# Patient Record
Sex: FEMALE | Race: WHITE | NOT HISPANIC OR LATINO | Employment: FULL TIME | URBAN - METROPOLITAN AREA
[De-identification: names, ages, dates, MRNs, and addresses within clinical notes are randomized per-mention and may not be internally consistent; named-entity substitution may affect disease eponyms.]

---

## 2017-06-07 ENCOUNTER — APPOINTMENT (OUTPATIENT)
Dept: WOMENS IMAGING | Facility: HOSPITAL | Age: 57
End: 2017-06-07

## 2017-06-07 PROCEDURE — 77067 SCR MAMMO BI INCL CAD: CPT | Performed by: RADIOLOGY

## 2017-06-07 PROCEDURE — G0202 SCR MAMMO BI INCL CAD: HCPCS | Performed by: RADIOLOGY

## 2018-06-12 ENCOUNTER — APPOINTMENT (OUTPATIENT)
Dept: WOMENS IMAGING | Facility: HOSPITAL | Age: 58
End: 2018-06-12

## 2018-06-12 PROCEDURE — 77067 SCR MAMMO BI INCL CAD: CPT | Performed by: RADIOLOGY

## 2019-06-17 ENCOUNTER — APPOINTMENT (OUTPATIENT)
Dept: WOMENS IMAGING | Facility: HOSPITAL | Age: 59
End: 2019-06-17

## 2019-06-17 PROCEDURE — 77067 SCR MAMMO BI INCL CAD: CPT | Performed by: RADIOLOGY

## 2020-06-30 ENCOUNTER — APPOINTMENT (OUTPATIENT)
Dept: WOMENS IMAGING | Facility: HOSPITAL | Age: 60
End: 2020-06-30

## 2020-06-30 PROCEDURE — 77063 BREAST TOMOSYNTHESIS BI: CPT | Performed by: RADIOLOGY

## 2020-06-30 PROCEDURE — 77067 SCR MAMMO BI INCL CAD: CPT | Performed by: RADIOLOGY

## 2021-08-02 ENCOUNTER — OFFICE VISIT (OUTPATIENT)
Dept: FAMILY MEDICINE CLINIC | Facility: CLINIC | Age: 61
End: 2021-08-02

## 2021-08-02 VITALS
BODY MASS INDEX: 28.34 KG/M2 | DIASTOLIC BLOOD PRESSURE: 68 MMHG | HEIGHT: 62 IN | OXYGEN SATURATION: 100 % | HEART RATE: 64 BPM | TEMPERATURE: 97.7 F | WEIGHT: 154 LBS | SYSTOLIC BLOOD PRESSURE: 107 MMHG | RESPIRATION RATE: 16 BRPM

## 2021-08-02 DIAGNOSIS — E78.2 MIXED HYPERLIPIDEMIA: ICD-10-CM

## 2021-08-02 DIAGNOSIS — Z91.09 ENVIRONMENTAL ALLERGIES: ICD-10-CM

## 2021-08-02 DIAGNOSIS — Z12.31 VISIT FOR SCREENING MAMMOGRAM: ICD-10-CM

## 2021-08-02 DIAGNOSIS — E03.9 ACQUIRED HYPOTHYROIDISM: Primary | ICD-10-CM

## 2021-08-02 PROBLEM — F41.9 ANXIETY: Status: ACTIVE | Noted: 2021-08-02

## 2021-08-02 PROBLEM — M89.9 BONE DISORDER: Status: ACTIVE | Noted: 2021-08-02

## 2021-08-02 PROBLEM — E78.5 HYPERLIPIDEMIA: Status: ACTIVE | Noted: 2021-08-02

## 2021-08-02 PROBLEM — E07.9 THYROID DISEASE: Status: ACTIVE | Noted: 2021-08-02

## 2021-08-02 PROBLEM — F32.A DEPRESSION: Status: ACTIVE | Noted: 2021-08-02

## 2021-08-02 PROBLEM — R51.9 HEADACHE: Status: ACTIVE | Noted: 2021-08-02

## 2021-08-02 PROCEDURE — 99203 OFFICE O/P NEW LOW 30 MIN: CPT | Performed by: FAMILY MEDICINE

## 2021-08-02 RX ORDER — MULTIVIT WITH MINERALS/LUTEIN
1 TABLET ORAL DAILY
COMMUNITY

## 2021-08-02 RX ORDER — LEVOTHYROXINE SODIUM 88 UG/1
1 TABLET ORAL
COMMUNITY
Start: 2021-06-09

## 2021-08-02 RX ORDER — LEVOTHYROXINE SODIUM 88 UG/1
1 TABLET ORAL DAILY
COMMUNITY
End: 2021-08-02

## 2021-08-02 RX ORDER — DIPHENOXYLATE HYDROCHLORIDE AND ATROPINE SULFATE 2.5; .025 MG/1; MG/1
1 TABLET ORAL DAILY
COMMUNITY

## 2021-08-02 RX ORDER — LEVOTHYROXINE SODIUM 88 UG/1
88 TABLET ORAL
COMMUNITY
Start: 2021-02-20 | End: 2021-08-02

## 2021-08-02 RX ORDER — LEVOTHYROXINE AND LIOTHYRONINE 57; 13.5 UG/1; UG/1
1 TABLET ORAL DAILY
COMMUNITY
End: 2021-08-02

## 2021-08-02 RX ORDER — FLUTICASONE PROPIONATE 50 MCG
1 SPRAY, SUSPENSION (ML) NASAL DAILY
COMMUNITY
Start: 2016-11-17

## 2021-08-02 NOTE — PROGRESS NOTES
Subjective   Sindy Smith is a 60 y.o. female.     Chief Complaint   Patient presents with   • Establish Care   • Hyperlipidemia     Patient is not fasting today.    • Hypothyroidism   • Headache         Current Outpatient Medications:   •  calcium citrate-vitamin d (CALCITRATE) 315-250 MG-UNIT tablet tablet, Take 1 tablet by mouth Daily., Disp: , Rfl:   •  Cetirizine HCl 10 MG capsule, Take 1 tablet by mouth Daily., Disp: , Rfl:   •  CINNAMON PO, Take 1 capsule by mouth Daily., Disp: , Rfl:   •  Euthyrox 88 MCG tablet, Take 1 tablet by mouth Before Breakfast., Disp: , Rfl:   •  fluticasone (FLONASE) 50 MCG/ACT nasal spray, 1 spray into the nostril(s) as directed by provider Daily., Disp: , Rfl:   •  multivitamin (MULTIPLE VITAMINS PO), Take 1 tablet by mouth Daily., Disp: , Rfl:   •  vitamin C (ASCORBIC ACID) 250 MG tablet, Take 1 tablet by mouth Daily., Disp: , Rfl:     Past Medical History:   Diagnosis Date   • Allergic    • Anxiety 8/2/2021   • Arthritis    • Depression 8/2/2021   • Headache 8/2/2021   • Hyperlipidemia 8/2/2021   • Hypothyroidism    • MAMMO     NEG = 2020   • PAP     NEG = 2020       Past Surgical History:   Procedure Laterality Date   • APPENDECTOMY  2019   • COLONOSCOPY      2014 = NEG, rech 2024   • TUBAL ABDOMINAL LIGATION  1990   • VOCAL CORD MASS EXCISION      Cyst       Family History   Problem Relation Age of Onset   • Cancer Mother         Pancreatic Cancer    • Hyperlipidemia Father    • Heart disease Father         MI @ 40   • Hyperlipidemia Sister    • Diabetes Brother        Social History     Socioeconomic History   • Marital status: Unknown     Spouse name: Not on file   • Number of children: Not on file   • Years of education: Not on file   • Highest education level: Not on file   Tobacco Use   • Smoking status: Never Smoker   • Smokeless tobacco: Never Used   Vaping Use   • Vaping Use: Never used   Substance and Sexual Activity   • Alcohol use: Yes     Comment: 1-2 drinks a  week    • Drug use: Never   • Sexual activity: Defer       61 y/o C female here to est care w/ hx/o Hypothyroid/ CHOL/ HA    Pt currently taking thyroid meds and otc allergy meds-----Pt sees Dr Alejandre for her thyroid and states she does labs for this as well       The following portions of the patient's history were reviewed and updated as appropriate: allergies, current medications, past family history, past medical history, past social history, past surgical history and problem list.    Review of Systems   Constitutional: Negative for activity change, appetite change, fatigue, unexpected weight gain and unexpected weight loss.   Respiratory: Negative for cough, chest tightness and shortness of breath.    Cardiovascular: Negative for chest pain, palpitations and leg swelling.   Gastrointestinal: Negative for constipation and diarrhea.   Genitourinary: Negative for frequency, urgency and urinary incontinence.   Musculoskeletal: Negative for arthralgias and myalgias.   Neurological: Negative for dizziness, facial asymmetry, speech difficulty, weakness, light-headedness, headache, memory problem and confusion.   Psychiatric/Behavioral: Negative for sleep disturbance.       Vitals:    08/02/21 1402   BP: 107/68   Pulse: 64   Resp: 16   Temp: 97.7 °F (36.5 °C)   SpO2: 100%       Objective   Physical Exam  Vitals and nursing note reviewed.   Constitutional:       General: She is not in acute distress.     Appearance: She is well-developed. She is not ill-appearing or toxic-appearing.   HENT:      Head: Normocephalic and atraumatic.   Cardiovascular:      Rate and Rhythm: Normal rate and regular rhythm.      Heart sounds: Normal heart sounds. No murmur heard.     Pulmonary:      Effort: Pulmonary effort is normal. No respiratory distress.      Breath sounds: Normal breath sounds. No stridor. No wheezing, rhonchi or rales.   Musculoskeletal:      Cervical back: Normal range of motion and neck supple.   Skin:     General:  Skin is warm and dry.      Findings: No rash.   Neurological:      Mental Status: She is alert and oriented to person, place, and time.   Psychiatric:         Mood and Affect: Mood normal.         Behavior: Behavior normal.         Thought Content: Thought content normal.         Judgment: Judgment normal.           Assessment/Plan   Diagnoses and all orders for this visit:    1. Acquired hypothyroidism (Primary)  -     Comprehensive Metabolic Panel; Future  -     T4, Free; Future  -     TSH; Future    2. Mixed hyperlipidemia  -     Lipid Panel; Future    3. Environmental allergies    4. Visit for screening mammogram  -     Mammo Screening Digital Tomosynthesis Bilateral With CAD; Future    Vaccines disc'd  Copy of most recent labs  Rx Mammo

## 2021-08-09 ENCOUNTER — TELEPHONE (OUTPATIENT)
Dept: FAMILY MEDICINE CLINIC | Facility: CLINIC | Age: 61
End: 2021-08-09

## 2021-08-09 NOTE — TELEPHONE ENCOUNTER
Caller: David Smith    Relationship: Self    Best call back number: [phone]   What is the best time to reach you:ANYTIME   Who are you requesting to speak with (clinical staff, provider,  specific staff member): CLINICAL    Do you know the name of the person who called: DAVID    What was the call regarding: PATIENT WANTS TO ASK SOME QUESTION ABOUT COVID SHE HAS POSSIBLY HAD EXPOSURE.     Do you require a callback: YES

## 2021-08-09 NOTE — TELEPHONE ENCOUNTER
Pt just wanted information to know if you do any medication pretreatment for covid (ivermectin or hydroxychloroquine), if she were to be directly exposed or become ill. Her daughter-in-law had an exposure at work, but neither she or the pt are symptomatic. She's just trying to be proactive and wanted your advice.

## 2021-08-10 NOTE — TELEPHONE ENCOUNTER
None of that is helpful  Can take vit d 2000 IU qday/ vit C 500mg and zinc 50mg qday for prevention

## 2021-08-22 PROBLEM — E03.9 HYPOTHYROIDISM: Status: ACTIVE | Noted: 2021-08-02

## 2021-09-02 ENCOUNTER — TELEPHONE (OUTPATIENT)
Dept: FAMILY MEDICINE CLINIC | Facility: CLINIC | Age: 61
End: 2021-09-02

## 2021-09-02 PROCEDURE — U0004 COV-19 TEST NON-CDC HGH THRU: HCPCS | Performed by: FAMILY MEDICINE

## 2021-09-07 ENCOUNTER — TELEPHONE (OUTPATIENT)
Dept: FAMILY MEDICINE CLINIC | Facility: CLINIC | Age: 61
End: 2021-09-07

## 2021-09-07 RX ORDER — ONDANSETRON 4 MG/1
4 TABLET, FILM COATED ORAL EVERY 8 HOURS PRN
Qty: 20 TABLET | Refills: 0 | Status: SHIPPED | OUTPATIENT
Start: 2021-09-07

## 2021-09-07 NOTE — TELEPHONE ENCOUNTER
SW , Les. States pt currently taking a nap and just feels wiped out. Main symptoms are bodyaches and nausea. Breathing is ok and they know to take her to ER if condition worsens. Still taking abx for sinus infection also. Asks if she could get something for the nausea sent into [facility] [address], please.

## 2021-09-07 NOTE — TELEPHONE ENCOUNTER
----- Message from Starla Londono DO sent at 9/3/2021  8:50 PM EDT -----  Regarding: COVID-19 Lab Results (Informational Only - No Action Required)  COVID=POSITIVE----see how she is doing

## 2022-01-24 ENCOUNTER — TELEPHONE (OUTPATIENT)
Dept: FAMILY MEDICINE CLINIC | Facility: CLINIC | Age: 62
End: 2022-01-24

## 2022-01-24 NOTE — TELEPHONE ENCOUNTER
HUB to read  I called and left a VM with information.     I dont give out zpak antibiotics just because someone is +COVID------this is a virus and zpak is for bacterial pneumonia

## 2022-01-24 NOTE — TELEPHONE ENCOUNTER
I dont give out zpak abx just because someone is +COVID------this is a virus and zpak is for bacterial pneumonia

## 2022-01-25 ENCOUNTER — TELEPHONE (OUTPATIENT)
Dept: FAMILY MEDICINE CLINIC | Facility: CLINIC | Age: 62
End: 2022-01-25

## 2022-01-25 NOTE — TELEPHONE ENCOUNTER
Caller: Sindy Smith    Relationship: Self    Best call back number: [phone]    What medication are you requesting: COUGH MEDICINE    What are your current symptoms: COUGH    How long have you been experiencing symptoms: ABOUT 3 DAYS    Have you had these symptoms before:    [] Yes  [x] No    Have you been treated for these symptoms before:   [] Yes  [x] No    If a prescription is needed, what is your preferred pharmacy and phone number:  [facility]/pharmacy #[id number] - [place], [place] - [address] - [phone]  - [phone] FX      Additional notes: PATIENT CANT SLEEP AND SHE IS HAVING SOME ABDOMINAL SORENESS AND THROAT SORENESS FROM COUGHING SO MUCH. HUB READ INFORMATION ABOUT [facility], PATIENT VOICED UNDERSTANDING. PATIENT IS REQUESTING A CALLBACK ABOUT THIS MEDICATION REQUEST

## 2022-01-26 NOTE — TELEPHONE ENCOUNTER
HUB TO READ  LVM for pt.  Per Dr Londono appt is needed.  Dr Londono does not have anything available today.  Advised to go to [facility] [place].

## 2022-02-04 ENCOUNTER — TELEPHONE (OUTPATIENT)
Dept: FAMILY MEDICINE CLINIC | Facility: CLINIC | Age: 62
End: 2022-02-04

## 2022-02-04 NOTE — TELEPHONE ENCOUNTER
Patient was notified that the mammogram order should be good for a year. Patient states that she has [facility] phone number and she will gave them a call.

## 2022-02-04 NOTE — TELEPHONE ENCOUNTER
PATIENT IS WANTING TO GET HER MAMMOGRAM DONE, BUT IT MAY BE -- WAS ORDEREDIN 2021     CAN YOU CALL PATIENT AND CONFIRM WITH HER     PLEASE ADVISE   Sindy Smith (Self) [phone] (H)

## 2022-09-26 ENCOUNTER — ON CAMPUS - OUTPATIENT (OUTPATIENT)
Dept: URBAN - METROPOLITAN AREA HOSPITAL 2 | Facility: HOSPITAL | Age: 62
End: 2022-09-26
Payer: MEDICARE

## 2022-09-26 VITALS
RESPIRATION RATE: 22 BRPM | HEART RATE: 87 BPM | OXYGEN SATURATION: 98 % | HEART RATE: 85 BPM | OXYGEN SATURATION: 96 % | SYSTOLIC BLOOD PRESSURE: 134 MMHG | SYSTOLIC BLOOD PRESSURE: 94 MMHG | OXYGEN SATURATION: 100 % | DIASTOLIC BLOOD PRESSURE: 89 MMHG | WEIGHT: 152 LBS | RESPIRATION RATE: 18 BRPM | SYSTOLIC BLOOD PRESSURE: 111 MMHG | DIASTOLIC BLOOD PRESSURE: 56 MMHG | HEART RATE: 89 BPM | DIASTOLIC BLOOD PRESSURE: 70 MMHG | SYSTOLIC BLOOD PRESSURE: 100 MMHG | RESPIRATION RATE: 15 BRPM | DIASTOLIC BLOOD PRESSURE: 53 MMHG | RESPIRATION RATE: 16 BRPM | SYSTOLIC BLOOD PRESSURE: 109 MMHG | HEIGHT: 62 IN | SYSTOLIC BLOOD PRESSURE: 92 MMHG | DIASTOLIC BLOOD PRESSURE: 64 MMHG | HEART RATE: 75 BPM | DIASTOLIC BLOOD PRESSURE: 73 MMHG | HEART RATE: 82 BPM | TEMPERATURE: 97.7 F | OXYGEN SATURATION: 99 % | HEART RATE: 84 BPM | HEART RATE: 86 BPM | DIASTOLIC BLOOD PRESSURE: 62 MMHG | SYSTOLIC BLOOD PRESSURE: 129 MMHG

## 2022-09-26 DIAGNOSIS — Z12.11 ENCOUNTER FOR SCREENING FOR MALIGNANT NEOPLASM OF COLON: ICD-10-CM

## 2022-09-26 DIAGNOSIS — K57.30 DIVERTICULOSIS OF LARGE INTESTINE WITHOUT PERFORATION OR ABS: ICD-10-CM

## 2022-09-26 PROCEDURE — 45378 DIAGNOSTIC COLONOSCOPY: CPT | Mod: 33 | Performed by: INTERNAL MEDICINE

## 2023-08-07 ENCOUNTER — OFFICE VISIT (OUTPATIENT)
Dept: ORTHOPEDIC SURGERY | Facility: CLINIC | Age: 63
End: 2023-08-07
Payer: COMMERCIAL

## 2023-08-07 VITALS — WEIGHT: 154 LBS | HEIGHT: 62 IN | BODY MASS INDEX: 28.34 KG/M2 | HEART RATE: 74 BPM

## 2023-08-07 DIAGNOSIS — M25.532 ACUTE PAIN OF LEFT WRIST: Primary | ICD-10-CM

## 2023-08-07 PROCEDURE — 99203 OFFICE O/P NEW LOW 30 MIN: CPT | Performed by: ORTHOPAEDIC SURGERY

## 2023-08-07 NOTE — PROGRESS NOTES
"     Patient ID: Sindy Smith is a 62 y.o. female.    Chief Complaint:    Chief Complaint   Patient presents with    Left Wrist - Initial Evaluation, Pain     Pain 7/24/2023 Pain 3-4       HPI:  This is a 62-year-old female with left wrist pain after a fall about 2 weeks ago.  She did obtain an x-ray on Friday for continued pain and has not been placed into a brace.  Pain is somewhat diffuse over the thumb side of the wrist as well as the ulnar styloid  Past Medical History:   Diagnosis Date    Allergic     Anxiety 08/02/2021    Arthritis     Depression 08/02/2021    Headache 08/02/2021    Hyperlipidemia 08/02/2021    Hypothyroidism     MAMMO     NEG = 2020/ 2022    Osteoporosis     PAP     NEG = 2020       Past Surgical History:   Procedure Laterality Date    APPENDECTOMY  2019    COLONOSCOPY      2014/ 2022 = NEG, rech 2032         GSI    TUBAL ABDOMINAL LIGATION  1990    VOCAL CORD MASS EXCISION      Cyst       Family History   Problem Relation Age of Onset    Cancer Mother         Pancreatic Cancer     Hyperlipidemia Father     Heart disease Father         MI @ 40    Hyperlipidemia Sister     Diabetes Brother           Social History     Occupational History    Not on file   Tobacco Use    Smoking status: Never     Passive exposure: Never    Smokeless tobacco: Never   Vaping Use    Vaping Use: Never used   Substance and Sexual Activity    Alcohol use: Yes     Comment: 1-2 drinks a week     Drug use: Never    Sexual activity: Defer      Review of Systems   Cardiovascular:  Negative for chest pain.   Musculoskeletal:  Positive for arthralgias.     Objective:    Pulse 74   Ht 157.5 cm (62\")   Wt 69.9 kg (154 lb)   BMI 28.17 kg/mý     Physical Examination:  Left wrist demonstrates some resolving bruising and mild tenderness over the ulnar styloid there is some mild pain in the snuffbox.  No real pain over the distal radius no significant pain to the carpus  Sensory and motor exam are intact all distributions. " Radial pulse is palpable and capillary refill is less than two seconds to all digits.    Imaging:  Prior x-rays are reviewed there appears to be an incomplete fracture of the dorsum of the triquetrum    Assessment:  Left wrist pain possible triquetrum fracture    Plan:  Discussed the findings on exam and x-ray there is not really a significant correlation between this fracture line and her presentation.  However I would continue the brace but allow her to come out of her range of motion with activity restriction shlomo-ray in 3 weeks      Procedures         Disclaimer: Part of this note may be an electronic transcription/translation of spoken language to printed text using the Dragon Dictation System

## 2023-08-21 ENCOUNTER — OFFICE VISIT (OUTPATIENT)
Dept: ORTHOPEDIC SURGERY | Facility: CLINIC | Age: 63
End: 2023-08-21
Payer: COMMERCIAL

## 2023-08-21 VITALS — WEIGHT: 154 LBS | BODY MASS INDEX: 28.34 KG/M2 | HEART RATE: 77 BPM | HEIGHT: 62 IN

## 2023-08-21 DIAGNOSIS — M25.532 ACUTE PAIN OF LEFT WRIST: Primary | ICD-10-CM

## 2023-08-21 PROCEDURE — 99212 OFFICE O/P EST SF 10 MIN: CPT | Performed by: ORTHOPAEDIC SURGERY

## 2023-08-21 RX ORDER — IVERMECTIN 3 MG/1
TABLET ORAL
COMMUNITY
Start: 2023-08-17

## 2023-08-21 NOTE — PROGRESS NOTES
Patient ID: Sindy Smith is a 62 y.o. female.  Left wrist pain  Possible triquetrum fracture suffered July 24 treated conservatively  Pain overall improving is mainly ulnar  Review of Systems:        Objective:    There were no vitals taken for this visit.    Physical Examination:     Left wrist has some mild tenderness over the dorsum of the carpus just distal to the ulnar styloid no pain in the snuffbox  Has full range of motion the elbow forearm wrist and digits    Imaging:   left Wrist X-Ray  Indication: Wrist pain possible fracture  AP, Lateral oblique views  Findings: Sclerosis at the area of previously visualized nondisplaced triquetral fracture  no bony lesion  Soft tissues normal  normal joint spaces  Hardware appropriately positioned not applicable      yes prior studies available for comparison    Assessment:    Healing triquetrum fracture    Plan:   She can wean out of the brace discussed normal recovery if symptoms continue longer than the next 3 to 4 weeks see me as needed      Procedures          Disclaimer: Part of this note may be an electronic transcription/translation of spoken language to printed text using the Dragon Dictation System

## 2024-06-05 ENCOUNTER — TELEPHONE (OUTPATIENT)
Dept: FAMILY MEDICINE CLINIC | Facility: CLINIC | Age: 64
End: 2024-06-05
Payer: COMMERCIAL

## 2024-06-05 DIAGNOSIS — E78.2 MIXED HYPERLIPIDEMIA: Primary | ICD-10-CM

## 2024-06-10 ENCOUNTER — CLINICAL SUPPORT (OUTPATIENT)
Dept: FAMILY MEDICINE CLINIC | Facility: CLINIC | Age: 64
End: 2024-06-10
Payer: COMMERCIAL

## 2024-06-10 DIAGNOSIS — E78.2 MIXED HYPERLIPIDEMIA: ICD-10-CM

## 2024-06-10 PROCEDURE — 80061 LIPID PANEL: CPT | Performed by: FAMILY MEDICINE

## 2024-06-10 PROCEDURE — 36415 COLL VENOUS BLD VENIPUNCTURE: CPT | Performed by: FAMILY MEDICINE

## 2024-06-10 PROCEDURE — 80053 COMPREHEN METABOLIC PANEL: CPT | Performed by: FAMILY MEDICINE

## 2024-06-10 NOTE — PROGRESS NOTES
Venipuncture performed in L ARM by RT Cameron  with good hemostasis. Patient tolerated well. 06/10/24 Nakita Sousa, RT

## 2024-06-11 LAB
ALBUMIN SERPL-MCNC: 4 G/DL (ref 3.5–5.2)
ALBUMIN/GLOB SERPL: 1.5 G/DL
ALP SERPL-CCNC: 42 U/L (ref 39–117)
ALT SERPL W P-5'-P-CCNC: 11 U/L (ref 1–33)
ANION GAP SERPL CALCULATED.3IONS-SCNC: 11.9 MMOL/L (ref 5–15)
AST SERPL-CCNC: 12 U/L (ref 1–32)
BILIRUB SERPL-MCNC: 0.3 MG/DL (ref 0–1.2)
BUN SERPL-MCNC: 14 MG/DL (ref 8–23)
BUN/CREAT SERPL: 16.5 (ref 7–25)
CALCIUM SPEC-SCNC: 9.3 MG/DL (ref 8.6–10.5)
CHLORIDE SERPL-SCNC: 107 MMOL/L (ref 98–107)
CHOLEST SERPL-MCNC: 241 MG/DL (ref 0–200)
CO2 SERPL-SCNC: 24.1 MMOL/L (ref 22–29)
CREAT SERPL-MCNC: 0.85 MG/DL (ref 0.57–1)
EGFRCR SERPLBLD CKD-EPI 2021: 77.6 ML/MIN/1.73
GLOBULIN UR ELPH-MCNC: 2.7 GM/DL
GLUCOSE SERPL-MCNC: 81 MG/DL (ref 65–99)
HDLC SERPL-MCNC: 46 MG/DL (ref 40–60)
LDLC SERPL CALC-MCNC: 186 MG/DL (ref 0–100)
LDLC/HDLC SERPL: 3.99 {RATIO}
POTASSIUM SERPL-SCNC: 4.6 MMOL/L (ref 3.5–5.2)
PROT SERPL-MCNC: 6.7 G/DL (ref 6–8.5)
SODIUM SERPL-SCNC: 143 MMOL/L (ref 136–145)
TRIGL SERPL-MCNC: 57 MG/DL (ref 0–150)
VLDLC SERPL-MCNC: 9 MG/DL (ref 5–40)

## 2024-06-17 ENCOUNTER — OFFICE VISIT (OUTPATIENT)
Dept: FAMILY MEDICINE CLINIC | Facility: CLINIC | Age: 64
End: 2024-06-17
Payer: COMMERCIAL

## 2024-06-17 VITALS
SYSTOLIC BLOOD PRESSURE: 94 MMHG | HEIGHT: 62 IN | BODY MASS INDEX: 23.74 KG/M2 | HEART RATE: 77 BPM | TEMPERATURE: 97.3 F | RESPIRATION RATE: 12 BRPM | WEIGHT: 129 LBS | DIASTOLIC BLOOD PRESSURE: 58 MMHG | OXYGEN SATURATION: 98 %

## 2024-06-17 DIAGNOSIS — Z00.00 ANNUAL PHYSICAL EXAM: Primary | ICD-10-CM

## 2024-06-17 DIAGNOSIS — E03.9 ACQUIRED HYPOTHYROIDISM: ICD-10-CM

## 2024-06-17 DIAGNOSIS — Z91.09 ENVIRONMENTAL ALLERGIES: ICD-10-CM

## 2024-06-17 PROCEDURE — 99396 PREV VISIT EST AGE 40-64: CPT | Performed by: FAMILY MEDICINE

## 2024-06-17 RX ORDER — FLUTICASONE PROPIONATE 50 MCG
1 SPRAY, SUSPENSION (ML) NASAL DAILY
Qty: 48 G | Refills: 3 | Status: SHIPPED | OUTPATIENT
Start: 2024-06-17

## 2024-06-17 RX ORDER — CHOLECALCIFEROL (VITAMIN D3) 50 MCG
2000 TABLET ORAL DAILY
Start: 2024-06-17 | End: 2025-06-17

## 2024-06-17 NOTE — PROGRESS NOTES
Subjective   Sindy Smith is a 63 y.o. female.     Chief Complaint   Patient presents with    Annual Exam     Physical/No papsmear          Current Outpatient Medications:     calcium citrate-vitamin d (CALCITRATE) 315-250 MG-UNIT tablet tablet, Take 1 tablet by mouth Daily., Disp: , Rfl:     Cetirizine HCl 10 MG capsule, Take 1 tablet by mouth Daily., Disp: , Rfl:     CINNAMON PO, Take 1 capsule by mouth Daily., Disp: , Rfl:     Euthyrox 88 MCG tablet, Take 1 tablet by mouth Before Breakfast., Disp: , Rfl:     fluticasone (FLONASE) 50 MCG/ACT nasal spray, 1 spray into the nostril(s) as directed by provider Daily., Disp: , Rfl:     multivitamin (THERAGRAN) tablet tablet, Take 1 tablet by mouth Daily., Disp: , Rfl:     ondansetron (Zofran) 4 MG tablet, Take 1 tablet by mouth Every 8 (Eight) Hours As Needed for Nausea or Vomiting., Disp: 20 tablet, Rfl: 0    vitamin C (ASCORBIC ACID) 250 MG tablet, Take 1 tablet by mouth Daily., Disp: , Rfl:     Past Medical History:   Diagnosis Date    Allergic     Anxiety 08/02/2021    Arthritis     Depression 08/02/2021    Headache 08/02/2021    Hyperlipidemia 08/02/2021    Hypothyroidism     MAMMO     NEG = 2020/ 2022/ 2024    Osteoporosis     PAP     NEG = 2020       Past Surgical History:   Procedure Laterality Date    APPENDECTOMY  2019    COLONOSCOPY      2014/ 2022 = NEG, rech 2032         GSI    TUBAL ABDOMINAL LIGATION  1990    VOCAL CORD MASS EXCISION      Cyst       Family History   Problem Relation Age of Onset    Cancer Mother         Pancreatic Cancer     Hyperlipidemia Father     Heart disease Father         MI @ 40    Hyperlipidemia Sister     Diabetes Brother        Social History     Socioeconomic History    Marital status:    Tobacco Use    Smoking status: Never     Passive exposure: Never    Smokeless tobacco: Never   Vaping Use    Vaping status: Never Used   Substance and Sexual Activity    Alcohol use: Yes     Comment: 1-2 drinks a week     Drug use:  Never    Sexual activity: Defer       History of Present Illness  The patient is a 63-year-old  female here for annual physical, no Pap.    The patient's current medication regimen includes daily calcium with vitamin D, Zyrtec as needed, cinnamon, thyroid 88, Flonase as needed, a multivitamin, Zofran as needed, and vitamin C.     She has not received any adult vaccines. She consults with Dr. Chelsy Alejandre annually, with annual blood work conducted 2 to 3 times annually. Her thyroid medication is refilled by Dr. Chelsy Alejandre. She has visited the dentist within the past 12 months.     Recently, she developed severe conjunctivitis in both eyes due to allergies, for which she was prescribed steroids and antibiotics from [facility]. She uses prescription allergy eye drops when engaging in yard work and when removing her contact lenses.     She has a rough, brown, and raised mole that she attempts to peel off, having scraped it once. She occasionally experiences gas, bloating, diarrhea, constipation, and heartburn, which she manages during flare-ups. She denies any dysphagia or choking. She is employed full-time and does not require a work note. She denies any skin lesions or spots. She maintains a healthy diet 90 percent of the time. She has a [employer] membership, but due to her eye infection, she did not attend the gym.    The patient recently experienced sciatica. She does not identify any specific triggers for her sciatica. She manages her sciatica with Aleve and ibuprofen, applying ice, and performing chair yoga.   She is a non-smoker. She drinks 1 or 2 alcoholic beverages a week. She does not use drugs.       Her older sister has memory issues.   She is allergic to CIPRO and LATEX.        The following portions of the patient's history were reviewed and updated as appropriate: allergies, current medications, past family history, past medical history, past social history, past surgical history and problem  list.    Review of Systems   Constitutional:  Negative for activity change, appetite change, fatigue, unexpected weight gain and unexpected weight loss.   HENT:  Negative for congestion, ear pain, hearing loss, nosebleeds, postnasal drip, rhinorrhea, sinus pressure, sneezing, sore throat, tinnitus and trouble swallowing.    Eyes:  Negative for blurred vision and double vision.   Respiratory:  Negative for cough and shortness of breath.    Cardiovascular:  Negative for chest pain.   Gastrointestinal:  Positive for abdominal distention, constipation, diarrhea and GERD. Negative for abdominal pain, nausea, vomiting and indigestion.   Genitourinary:  Negative for difficulty urinating, dysuria, flank pain, frequency, urgency and urinary incontinence.   Musculoskeletal:  Positive for back pain and myalgias. Negative for arthralgias.   Skin:  Negative for rash and skin lesions.   Allergic/Immunologic: Positive for environmental allergies.   Neurological:  Positive for numbness. Negative for weakness, memory problem and confusion.   Psychiatric/Behavioral:  Negative for agitation, self-injury, suicidal ideas, depressed mood and stress. The patient is not nervous/anxious.        Vitals:    06/17/24 0903   BP: 94/58   Pulse: 77   Resp: 12   Temp: 97.3 °F (36.3 °C)   SpO2: 98%       Objective   Physical Exam  Vitals and nursing note reviewed.   Constitutional:       General: She is not in acute distress.     Appearance: Normal appearance. She is well-developed. She is not ill-appearing, toxic-appearing or diaphoretic.   HENT:      Head: Normocephalic and atraumatic.      Right Ear: Tympanic membrane, ear canal and external ear normal. There is no impacted cerumen.      Left Ear: Tympanic membrane, ear canal and external ear normal. There is no impacted cerumen.      Nose: Nose normal. No congestion or rhinorrhea.      Mouth/Throat:      Mouth: Mucous membranes are moist.      Pharynx: No oropharyngeal exudate or posterior  oropharyngeal erythema.   Eyes:      Extraocular Movements: Extraocular movements intact.      Conjunctiva/sclera: Conjunctivae normal.      Pupils: Pupils are equal, round, and reactive to light.   Neck:      Vascular: No carotid bruit.   Cardiovascular:      Rate and Rhythm: Normal rate and regular rhythm.      Heart sounds: Normal heart sounds. No murmur heard.  Pulmonary:      Effort: Pulmonary effort is normal. No respiratory distress.      Breath sounds: Normal breath sounds. No wheezing.   Abdominal:      General: Bowel sounds are normal. There is no distension.      Palpations: Abdomen is soft. There is no mass.      Tenderness: There is no abdominal tenderness. There is no guarding or rebound.      Hernia: No hernia is present.   Musculoskeletal:         General: Normal range of motion.      Cervical back: Normal range of motion and neck supple.      Right lower leg: No edema.      Left lower leg: No edema.   Lymphadenopathy:      Cervical: No cervical adenopathy.   Skin:     General: Skin is warm and dry.      Findings: No rash.   Neurological:      General: No focal deficit present.      Mental Status: She is alert and oriented to person, place, and time.      Cranial Nerves: No cranial nerve deficit.      Gait: Gait normal.   Psychiatric:         Attention and Perception: Attention and perception normal.         Mood and Affect: Mood and affect normal.         Speech: Speech normal.         Behavior: Behavior normal. Behavior is cooperative.         Thought Content: Thought content normal.         Cognition and Memory: Cognition and memory normal.         Judgment: Judgment normal.       Physical Exam  Vital Signs  Her blood pressure is in the low 90s/50s.     BMI is within normal parameters. No other follow-up for BMI required.      Assessment & Plan   There are no diagnoses linked to this encounter.  Assessment & Plan  1. Annual physical examination.  The patient's blood glucose, renal, and hepatic  functions are within normal parameters. Her vitamin D levels are below the normal range. The patient is advised to commence a regimen of 2000 International Units of vitamin D daily. A prescription for a 3-month supply of Flonase and Zyrtec has been issued. A referral for an ophthalmological examination has been made. The patient is eligible for the RSV, shingles, and RSV vaccines, which she will consider. A Pap smear is scheduled for 2025.    2. Mole.  Should the mole increase in size or alter characteristics, the patient is advised to inform us immediately, at which point a referral to dermatology will be considered.    3. Sciatica.  The patient's sciatica is currently manageable. The patient is advised to apply ice, take anti-inflammatory medications, engage in stretching exercises, and take muscle relaxants as needed. She is cautioned against bending and lifting. Regular stretching exercises are recommended, irrespective of the source of pain. She is encouraged to perform these exercises prior to and after workouts. If the pain becomes severe, weightlifting with machines should be avoided.     Results  Laboratory Studies  Vitamin D was low at 28.          Patient or patient representative verbalized consent for the use of Ambient Listening during the visit with  Starla Londono DO for chart documentation. 6/30/2024  18:55 EDT

## 2024-07-24 ENCOUNTER — TELEPHONE (OUTPATIENT)
Dept: FAMILY MEDICINE CLINIC | Facility: CLINIC | Age: 64
End: 2024-07-24

## 2024-07-24 NOTE — TELEPHONE ENCOUNTER
Caller: Sindy Smith    Relationship: Self    Best call back number:     [phone]          What was the call regarding:   PATIENT REQUESTING LABS ESPECIALLY TSH.      I

## 2024-07-25 DIAGNOSIS — E03.9 ACQUIRED HYPOTHYROIDISM: Primary | ICD-10-CM

## 2024-07-25 RX ORDER — LEVOTHYROXINE SODIUM 88 UG/1
TABLET ORAL
Status: SHIPPED
Start: 2024-07-25

## 2024-07-25 NOTE — TELEPHONE ENCOUNTER
States she was in earlier with you this month, and you had discussed taking over her hypothyroidism care since her [facility] Endo, Dr Chelsy Nick is retiring. She saw her 2 days ago and had her dosage adjusted and will be due a TSH in 8 wks to rech. New dosage 88mcg daily, and adding 1/2 tab (=132mcg) on Sundays only.

## 2024-08-05 ENCOUNTER — CLINICAL SUPPORT (OUTPATIENT)
Dept: FAMILY MEDICINE CLINIC | Facility: CLINIC | Age: 64
End: 2024-08-05
Payer: COMMERCIAL

## 2024-08-05 DIAGNOSIS — E03.9 ACQUIRED HYPOTHYROIDISM: ICD-10-CM

## 2024-08-05 LAB
T4 FREE SERPL-MCNC: 1.7 NG/DL (ref 0.93–1.7)
TSH SERPL DL<=0.05 MIU/L-ACNC: 2.31 UIU/ML (ref 0.27–4.2)

## 2024-08-05 PROCEDURE — 84439 ASSAY OF FREE THYROXINE: CPT | Performed by: FAMILY MEDICINE

## 2024-08-05 PROCEDURE — 36415 COLL VENOUS BLD VENIPUNCTURE: CPT | Performed by: FAMILY MEDICINE

## 2024-08-05 PROCEDURE — 84443 ASSAY THYROID STIM HORMONE: CPT | Performed by: FAMILY MEDICINE

## 2024-08-05 NOTE — PROGRESS NOTES
Venipuncture performed in L ARM by RT Cameron  with good hemostasis. Patient tolerated well. 08/05/24 Nakita Sousa, RT